# Patient Record
Sex: FEMALE | Race: NATIVE HAWAIIAN OR OTHER PACIFIC ISLANDER | Employment: OTHER | URBAN - METROPOLITAN AREA
[De-identification: names, ages, dates, MRNs, and addresses within clinical notes are randomized per-mention and may not be internally consistent; named-entity substitution may affect disease eponyms.]

---

## 2017-11-30 ENCOUNTER — TELEPHONE (OUTPATIENT)
Dept: SURGERY | Age: 72
End: 2017-11-30

## 2017-11-30 NOTE — TELEPHONE ENCOUNTER
Could not leave message for pt to r/s appointment from 12- due to no insurance referral. Phone message said to call back could not reach party.

## 2017-12-01 ENCOUNTER — OFFICE VISIT (OUTPATIENT)
Dept: SURGERY | Age: 72
End: 2017-12-01

## 2017-12-01 VITALS
OXYGEN SATURATION: 95 % | HEART RATE: 52 BPM | DIASTOLIC BLOOD PRESSURE: 86 MMHG | TEMPERATURE: 98 F | HEIGHT: 60 IN | WEIGHT: 152.4 LBS | SYSTOLIC BLOOD PRESSURE: 164 MMHG | RESPIRATION RATE: 16 BRPM | BODY MASS INDEX: 29.92 KG/M2

## 2017-12-01 DIAGNOSIS — Z12.11 ENCOUNTER FOR SCREENING COLONOSCOPY: Primary | ICD-10-CM

## 2017-12-01 RX ORDER — QUETIAPINE FUMARATE 200 MG/1
200 TABLET, FILM COATED ORAL 2 TIMES DAILY
COMMUNITY

## 2017-12-01 RX ORDER — POLYETHYLENE GLYCOL 3350, SODIUM SULFATE ANHYDROUS, SODIUM BICARBONATE, SODIUM CHLORIDE, POTASSIUM CHLORIDE 236; 22.74; 6.74; 5.86; 2.97 G/4L; G/4L; G/4L; G/4L; G/4L
POWDER, FOR SOLUTION ORAL
Qty: 1 BOTTLE | Refills: 0 | Status: SHIPPED | OUTPATIENT
Start: 2017-12-01 | End: 2017-12-01 | Stop reason: CLARIF

## 2017-12-01 RX ORDER — POLYETHYLENE GLYCOL 3350, SODIUM SULFATE ANHYDROUS, SODIUM BICARBONATE, SODIUM CHLORIDE, POTASSIUM CHLORIDE 236; 22.74; 6.74; 5.86; 2.97 G/4L; G/4L; G/4L; G/4L; G/4L
POWDER, FOR SOLUTION ORAL
Qty: 1 BOTTLE | Refills: 0 | Status: SHIPPED | OUTPATIENT
Start: 2017-12-01 | End: 2018-01-25

## 2017-12-01 NOTE — MR AVS SNAPSHOT
Visit Information Date & Time Provider Department Dept. Phone Encounter #  
 12/1/2017 10:30 AM MAYE Parikh. [id number] Upcoming Health Maintenance Date Due DTaP/Tdap/Td series (1 - Tdap) 5/12/1967 BREAST CANCER SCRN MAMMOGRAM 5/12/1996 FOBT Q 1 YEAR AGE 50-75 5/12/1996 ZOSTER VACCINE AGE 60> 3/12/2006 GLAUCOMA SCREENING Q2Y 5/12/2011 OSTEOPOROSIS SCREENING (DEXA) 5/12/2011 Pneumococcal 65+ Low/Medium Risk (1 of 2 - PCV13) 5/12/2011 MEDICARE YEARLY EXAM 5/12/2011 Influenza Age 5 to Adult 8/1/2017 Allergies as of 12/1/2017  Review Complete On: 12/1/2017 By: Janet Tejada RN No Known Allergies Current Immunizations  Never Reviewed No immunizations on file. Not reviewed this visit Vitals BP Pulse Temp Resp Height(growth percentile) Weight(growth percentile) 164/86 (BP 1 Location: Right arm, BP Patient Position: Sitting) (!) 52 98 °F (36.7 °C) (Oral) 16 5' (1.524 m) 152 lb 6.4 oz (69.1 kg) SpO2 BMI OB Status Smoking Status 95% 29.76 kg/m2 Menopause Never Smoker BMI and BSA Data Body Mass Index Body Surface Area  
 29.76 kg/m 2 1.71 m 2 Preferred Pharmacy Pharmacy Name Phone [id number] [phone] [phone] Your Updated Medication List  
  
   
This list is accurate as of: 12/1/17 11:02 AM.  Always use your most recent med list.  
  
  
  
  
 allopurinol 300 mg tablet Commonly known as:  ZYLOPRIM  
300 mg.  
  
 aspirin delayed-release 81 mg tablet Take 81 mg by mouth daily. atorvastatin 40 mg tablet Commonly known as:  LIPITOR Take 40 mg by mouth daily. donepezil 10 mg tablet Commonly known as:  ARICEPT Take 10 mg by mouth nightly. FLUoxetine 10 mg capsule Commonly known as:  PROzac  
5 mg. HYDROcodone-acetaminophen 5-325 mg per tablet Commonly known as:  Alvaro Reich Take 1 tablet by mouth every four (4) hours as needed for Pain.  
  
 isosorbide mononitrate ER 60 mg CR tablet Commonly known as:  IMDUR Take 30 mg by mouth every morning. lisinopril 2.5 mg tablet Commonly known as:  Renae Shames Take 2.5 mg by mouth daily. sertraline 100 mg tablet Commonly known as:  ZOLOFT Take 100 mg by mouth daily. traZODone 50 mg tablet Commonly known as:  Cornezoraida Haymaker Take 50 mg by mouth nightly. Introducing [facility] & HEALTH SERVICES! [facility] introduces Diavibe patient portal. Now you can access parts of your medical record, email your doctor's office, and request medication refills online. 1. In your internet browser, go to [email or web address] 2. Click on the First Time User? Click Here link in the Sign In box. You will see the New Member Sign Up page. 3. Enter your Diavibe Access Code exactly as it appears below. You will not need to use this code after youve completed the sign-up process. If you do not sign up before the expiration date, you must request a new code. · Diavibe Access Code: [email or web address] Expires: 3/1/2018 10:18 AM 
 
4. Enter the last four digits of your Social Security Number (xxxx) and Date of Birth (mm/dd/yyyy) as indicated and click Submit. You will be taken to the next sign-up page. 5. Create a Divvyshott ID. This will be your Diavibe login ID and cannot be changed, so think of one that is secure and easy to remember. 6. Create a Diavibe password. You can change your password at any time. 7. Enter your Password Reset Question and Answer. This can be used at a later time if you forget your password. 8. Enter your e-mail address. You will receive e-mail notification when new information is available in [address] 9. Click Sign Up. You can now view and download portions of your medical record. 10. Click the Download Summary menu link to download a portable copy of your medical information. If you have questions, please visit the Frequently Asked Questions section of the CellVir website. Remember, CellVir is NOT to be used for urgent needs. For medical emergencies, dial 911. Now available from your iPhone and Android! Please provide this summary of care documentation to your next provider. Your primary care clinician is listed as Bahman Eisenberg. If you have any questions after today's visit, please call [phone].

## 2017-12-01 NOTE — PATIENT INSTRUCTIONS
If you have any questions or concerns about today's appointment, the verbal and/or written instructions you were given for follow up care, please call our office at [phone].     [facility] - [address], [facility]    [phone] office  [phone]

## 2017-12-01 NOTE — PROGRESS NOTES
Colon Screen    Patient: Andrea Brock MRN: [id number]  SSN: [id number]    YOB: 1945  Age: 71 y.o. Sex: female        Subjective:   Andrea Brock presents for colon screening. PCP is Patricia Briceño MD. Patient denies abdominal pain, constipation, rectal pain or bleeding, recent FOBT positive. Abdominal surgeries as described below, specifically C section years ago. Triple bypass surgery April 2017. Patient has a bowel movement 3 times per week, according to patient son/caregiver this is normal bowel pattern. Patient has no known family history of colon cancer. First colonoscopy     No Known Allergies    Past Medical History:   Diagnosis Date    Dementia     Gout     Hypertension     Rheumatoid arthritis(714.0) ([facility] [address]      Past Surgical History:   Procedure Laterality Date    HX GYN  C Section    HX ORTHOPAEDIC Right     knee    HX VASCULAR ACCESS  Cardiac stents placed 10 years ago. No family history on file. Social History   Substance Use Topics    Smoking status: Never Smoker    Smokeless tobacco: Never Used    Alcohol use No      Prior to Admission medications    Medication Sig Start Date End Date Taking? Authorizing Provider   QUEtiapine (SEROQUEL) 200 mg tablet Take 200 mg by mouth two (2) times a day. Yes Historical Provider   CARVEDILOL (COREG PO) Take  by mouth. Yes Historical Provider   AMIODARONE HCL (AMIODARONE PO) Take 100 mg by mouth two (2) times a day. Yes Historical Provider   SODIUM BICARBONATE PO Take  by mouth. Yes Historical Provider   FUROSEMIDE (LASIX PO) Take  by mouth every other day. Yes Historical Provider   OXYBUTYNIN CHLORIDE PO Take  by mouth. Yes Historical Provider   allopurinol (ZYLOPRIM) 300 mg tablet 300 mg. Yes Historical Provider   donepezil (ARICEPT) 10 mg tablet Take 10 mg by mouth nightly. Yes Warren Gonzales MD   atorvastatin (LIPITOR) 40 mg tablet Take 40 mg by mouth daily.    Yes Warren Gonzales MD   aspirin delayed-release 81 mg tablet Take 81 mg by mouth daily. Yes Warren Gonzales MD   isosorbide mononitrate ER (IMDUR) 60 mg CR tablet Take 30 mg by mouth every morning. Yes Warren Gonzales MD   FLUoxetine (PROZAC) 10 mg capsule 5 mg. Historical Provider   hydrocodone-acetaminophen (NORCO) 5-325 mg per tablet Take 1 tablet by mouth every four (4) hours as needed for Pain. 9/27/14   Erna Staton NP   lisinopril (PRINIVIL, ZESTRIL) 2.5 mg tablet Take 2.5 mg by mouth daily. Warren Gonzales MD   traZODone (DESYREL) 50 mg tablet Take 50 mg by mouth nightly. Warren Gonzales MD   sertraline (ZOLOFT) 100 mg tablet Take 100 mg by mouth daily. Warren Gonzales MD          Review of Systems:  Gastrointestinal: negative      Risks colonoscopy described- colon injury, missed lesion, anesthesia problems, bleeding. Colonoscopy preparation reviewed in detail with patient and a copy of the instructions was provided to the patient.        Elisha Hawley RN  December 1, 2017  11:03 AM

## 2018-01-25 ENCOUNTER — HOSPITAL ENCOUNTER (OUTPATIENT)
Age: 73
Setting detail: OUTPATIENT SURGERY
Discharge: HOME | End: 2018-01-25
Attending: COLON & RECTAL SURGERY | Admitting: COLON & RECTAL SURGERY
Payer: MEDICARE

## 2018-01-25 VITALS
OXYGEN SATURATION: 96 % | BODY MASS INDEX: 30.43 KG/M2 | WEIGHT: 155 LBS | DIASTOLIC BLOOD PRESSURE: 84 MMHG | HEART RATE: 70 BPM | HEIGHT: 60 IN | SYSTOLIC BLOOD PRESSURE: 133 MMHG | TEMPERATURE: 98 F | RESPIRATION RATE: 16 BRPM

## 2018-01-25 PROCEDURE — 88305 TISSUE EXAM BY PATHOLOGIST: CPT | Performed by: COLON & RECTAL SURGERY

## 2018-01-25 PROCEDURE — 77030009426 HC FCPS BIOP ENDOSC BSC -B: Performed by: COLON & RECTAL SURGERY

## 2018-01-25 PROCEDURE — 76040000007: Performed by: COLON & RECTAL SURGERY

## 2018-01-25 PROCEDURE — 74011250636 HC RX REV CODE- 250/636

## 2018-01-25 PROCEDURE — G0500 MOD SEDAT ENDO SERVICE >5YRS: HCPCS | Performed by: COLON & RECTAL SURGERY

## 2018-01-25 PROCEDURE — 77030031670 HC DEV INFL ENDOTEK BIG60 MRTM -B: Performed by: COLON & RECTAL SURGERY

## 2018-01-25 RX ORDER — FLUMAZENIL 0.1 MG/ML
0.2 INJECTION INTRAVENOUS
Status: DISCONTINUED | OUTPATIENT
Start: 2018-01-25 | End: 2018-01-25 | Stop reason: HOSPADM

## 2018-01-25 RX ORDER — DEXTROMETHORPHAN/PSEUDOEPHED 2.5-7.5/.8
1.2 DROPS ORAL
Status: DISCONTINUED | OUTPATIENT
Start: 2018-01-25 | End: 2018-01-25 | Stop reason: HOSPADM

## 2018-01-25 RX ORDER — NALOXONE HYDROCHLORIDE 0.4 MG/ML
0.4 INJECTION, SOLUTION INTRAMUSCULAR; INTRAVENOUS; SUBCUTANEOUS
Status: DISCONTINUED | OUTPATIENT
Start: 2018-01-25 | End: 2018-01-25 | Stop reason: HOSPADM

## 2018-01-25 RX ORDER — SODIUM CHLORIDE 9 MG/ML
50 INJECTION, SOLUTION INTRAVENOUS CONTINUOUS
Status: DISCONTINUED | OUTPATIENT
Start: 2018-01-25 | End: 2018-01-25 | Stop reason: HOSPADM

## 2018-01-25 RX ORDER — EPINEPHRINE 0.1 MG/ML
1 INJECTION INTRACARDIAC; INTRAVENOUS
Status: DISCONTINUED | OUTPATIENT
Start: 2018-01-25 | End: 2018-01-25 | Stop reason: HOSPADM

## 2018-01-25 RX ORDER — SODIUM CHLORIDE 0.9 % (FLUSH) 0.9 %
5-10 SYRINGE (ML) INJECTION AS NEEDED
Status: DISCONTINUED | OUTPATIENT
Start: 2018-01-25 | End: 2018-01-25 | Stop reason: HOSPADM

## 2018-01-25 RX ORDER — MIDAZOLAM HYDROCHLORIDE 1 MG/ML
.25-5 INJECTION, SOLUTION INTRAMUSCULAR; INTRAVENOUS
Status: DISCONTINUED | OUTPATIENT
Start: 2018-01-25 | End: 2018-01-25 | Stop reason: HOSPADM

## 2018-01-25 RX ORDER — ATROPINE SULFATE 0.1 MG/ML
0.5 INJECTION INTRAVENOUS
Status: DISCONTINUED | OUTPATIENT
Start: 2018-01-25 | End: 2018-01-25 | Stop reason: HOSPADM

## 2018-01-25 RX ORDER — MIDAZOLAM HYDROCHLORIDE 1 MG/ML
INJECTION, SOLUTION INTRAMUSCULAR; INTRAVENOUS
Status: DISCONTINUED
Start: 2018-01-25 | End: 2018-01-25 | Stop reason: HOSPADM

## 2018-01-25 RX ORDER — SODIUM CHLORIDE 0.9 % (FLUSH) 0.9 %
5-10 SYRINGE (ML) INJECTION EVERY 8 HOURS
Status: DISCONTINUED | OUTPATIENT
Start: 2018-01-25 | End: 2018-01-25 | Stop reason: HOSPADM

## 2018-01-25 NOTE — IP AVS SNAPSHOT
[id number] 
[phone] Patient: David Hills MRN: [id number] About your hospitalization You were admitted on:  January 25, 2018 You last received care in the:  [facility] PHASE 2 RECOVERY You were discharged on:  January 25, 2018 Why you were hospitalized Your primary diagnosis was:  Not on File Follow-up Information Follow up With Details Comments Contact Info Shannan Camacho MD   [address] [facility] [address] [facility] [id number] [phone] MD Dr Sulma Heredia will contact you with results [address] [address] [id number] [phone] Discharge Orders None A check argenis indicates which time of day the medication should be taken. My Medications CONTINUE taking these medications Instructions Each Dose to Equal  
 Morning Noon Evening Bedtime  
 allopurinol 300 mg tablet Commonly known as:  Adriana Garrett Your last dose was: Your next dose is:    
   
   
 300 mg.  
 300 mg  
    
   
   
   
  
 AMIODARONE PO Your last dose was: Your next dose is: Take 100 mg by mouth two (2) times a day. 100 mg  
    
   
   
   
  
 aspirin delayed-release 81 mg tablet Your last dose was: Your next dose is: Take 81 mg by mouth daily. 81 mg  
    
   
   
   
  
 atorvastatin 40 mg tablet Commonly known as:  LIPITOR Your last dose was: Your next dose is: Take 40 mg by mouth daily. 40 mg COREG PO Your last dose was: Your next dose is: Take  by mouth. donepezil 10 mg tablet Commonly known as:  ARICEPT Your last dose was: Your next dose is: Take 10 mg by mouth nightly. 10 mg FLUoxetine 10 mg capsule Commonly known as:  PROzac  
 Your last dose was: Your next dose is:    
   
   
 5 mg.  
 5 mg HYDROcodone-acetaminophen 5-325 mg per tablet Commonly known as:  Rika Jarrett Your last dose was: Your next dose is: Take 1 tablet by mouth every four (4) hours as needed for Pain. 1 Tab  
    
   
   
   
  
 isosorbide mononitrate ER 60 mg CR tablet Commonly known as:  IMDUR Your last dose was: Your next dose is: Take 30 mg by mouth every morning. 30 mg  
    
   
   
   
  
 LASIX PO Your last dose was: Your next dose is: Take  by mouth every other day. lisinopril 2.5 mg tablet Commonly known as:  Mitzy Shoemaker Your last dose was: Your next dose is: Take 2.5 mg by mouth daily. 2.5 mg  
    
   
   
   
  
 OXYBUTYNIN CHLORIDE PO Your last dose was: Your next dose is: Take  by mouth. SEROquel 200 mg tablet Generic drug:  QUEtiapine Your last dose was: Your next dose is: Take 200 mg by mouth two (2) times a day. 200 mg  
    
   
   
   
  
 sertraline 100 mg tablet Commonly known as:  ZOLOFT Your last dose was: Your next dose is: Take 100 mg by mouth daily. 100 mg  
    
   
   
   
  
 SODIUM BICARBONATE PO Your last dose was: Your next dose is: Take  by mouth. traZODone 50 mg tablet Commonly known as:  Yohana Oshea Your last dose was: Your next dose is: Take 50 mg by mouth nightly. 50 mg  
    
   
   
   
  
  
STOP taking these medications PEG 3350-Electrolytes 236-22.74-6.74 -5.86 gram suspension Commonly known as:  GO-LYTELY Discharge Instructions Colonoscopy Discharge Instructions Altagracia Wooten [id number] 1945 COLONOSCOPY FINDINGS: 
Your colonoscopy showed: 1. One colon polyp which was completely removed. 2. Mild diverticulosis of the colon. FOLLOW UP RECOMMENDATIONS: 
 Dr. Quintin Feliz will contact you with your results. Dr. Quintin Feliz will recommend your next colonoscopy after the Pathology results are available. DISCOMFORT: 
If you have redness at your IV site- apply warm compress to area; if redness or soreness persist- contact your physician There may be a slight amount of blood passed from the rectum, more than a teaspoon of bright red blood is not expected - contact your physician Gaseous discomfort is common- walking, belching will help relieve any gas pains. If discomfort persist- contact your physician DIET: 
 High fiber diet. ACTIVITY: 
You may resume your normal daily activities, however, it is recommended that you spend the remainder of the day resting - avoiding any strenuous activities. You may not operate a vehicle for 24 hours You may not engage in an occupation involving machinery or appliances for rest of today You may not drink alcoholic beverages for at least 24 hours Avoid making any critical decisions for at least 24 hour CALL M.D. ANY SIGN OF: Increasing pain, nausea, vomiting Abdominal distension (swelling) New increased bleeding Fever or chills Pain in chest area or shortness of breath Arsh Parra MD, FACS, FASCRS Colon and Rectal Surgery [facility] Office [phone] Fax     [phone] DISCHARGE SUMMARY from Nurse PATIENT INSTRUCTIONS: 
 
After general anesthesia or intravenous sedation, for 24 hours or while taking prescription Narcotics: · Limit your activities · Do not drive and operate hazardous machinery · Do not make important personal or business decisions · Do  not drink alcoholic beverages · If you have not urinated within 8 hours after discharge, please contact your surgeon on call. Report the following to your surgeon: 
· Excessive pain, swelling, redness or odor of or around the surgical area · Temperature over 100.5 · Nausea and vomiting lasting longer than 4 hours or if unable to take medications · Any signs of decreased circulation or nerve impairment to extremity: change in color, persistent  numbness, tingling, coldness or increase pain · Any questions What to do at Home: These are general instructions for a healthy lifestyle: No smoking/ No tobacco products/ Avoid exposure to second hand smoke Surgeon General's Warning:  Quitting smoking now greatly reduces serious risk to your health. Obesity, smoking, and sedentary lifestyle greatly increases your risk for illness A healthy diet, regular physical exercise & weight monitoring are important for maintaining a healthy lifestyle You may be retaining fluid if you have a history of heart failure or if you experience any of the following symptoms:  Weight gain of 3 pounds or more overnight or 5 pounds in a week, increased swelling in our hands or feet or shortness of breath while lying flat in bed. Please call your doctor as soon as you notice any of these symptoms; do not wait until your next office visit. Recognize signs and symptoms of STROKE: 
 
F-face looks uneven A-arms unable to move or move unevenly S-speech slurred or non-existent T-time-call 911 as soon as signs and symptoms begin-DO NOT go Back to bed or wait to see if you get better-TIME IS BRAIN. Warning Signs of HEART ATTACK Call 911 if you have these symptoms: 
? Chest discomfort. Most heart attacks involve discomfort in the center of the chest that lasts more than a few minutes, or that goes away and comes back. It can feel like uncomfortable pressure, squeezing, fullness, or pain. ? Discomfort in other areas of the upper body.  Symptoms can include pain or discomfort in one or both arms, the back, neck, jaw, or stomach. ? Shortness of breath with or without chest discomfort. ? Other signs may include breaking out in a cold sweat, nausea, or lightheadedness. Don't wait more than five minutes to call [address]! Fast action can save your life. Calling 911 is almost always the fastest way to get lifesaving treatment. Emergency Medical Services staff can begin treatment when they arrive  up to an hour sooner than if someone gets to the hospital by car. The discharge information has been reviewed with the patient. The patient verbalized understanding. Discharge medications reviewed with the patient and appropriate educational materials and side effects teaching were provided. ___________________________________________________________________________________________________________________________________ Patient armband removed and given to patient to take home. Patient was informed of the privacy risks if armband lost or stolen Introducing [facility] & HEALTH SERVICES! Carlos Liang introduces [employer] patient portal. Now you can access parts of your medical record, email your doctor's office, and request medication refills online. 1. In your internet browser, go to [email or web address] 2. Click on the First Time User? Click Here link in the Sign In box. You will see the New Member Sign Up page. 3. Enter your [employer] Access Code exactly as it appears below. You will not need to use this code after youve completed the sign-up process. If you do not sign up before the expiration date, you must request a new code. · [employer] Access Code: [email or web address] Expires: 3/1/2018 10:18 AM 
 
4. Enter the last four digits of your Social Security Number (xxxx) and Date of Birth (mm/dd/yyyy) as indicated and click Submit. You will be taken to the next sign-up page. 5. Create a AppLovin ID. This will be your AppLov[employer] login ID and cannot be changed, so think of one that is secure and easy to remember. 6. Create a AppLovin password. You can change your password at any time. 7. Enter your Password Reset Question and Answer. This can be used at a later time if you forget your password. 8. Enter your e-mail address. You will receive e-mail notification when new information is available in [address] 9. Click Sign Up. You can now view and download portions of your medical record. 10. Click the Download Summary menu link to download a portable copy of your medical information. If you have questions, please visit the Frequently Asked Questions section of the AppLov[employer] website. Remember, [employer] is NOT to be used for urgent needs. For medical emergencies, dial 911. Now available from your iPhone and Android! Providers Seen During Your Hospitalization Provider Specialty Primary office phone Da Botello MD Colon and Rectal Surgery [phone] Your Primary Care Physician (PCP) Primary Care Physician Office Phone Office Fax Jemima Boy [phone] You are allergic to the following No active allergies Recent Documentation Height Weight BMI OB Status Smoking Status 1.524 m 70.3 kg 30.27 kg/m2 Menopause Never Smoker Emergency Contacts Name Discharge Info Relation Home Work Mobile [address] CAREGIVER [3] Son [22]   [phone] Patient Belongings The following personal items are in your possession at time of discharge: 
  Dental Appliances: None  Visual Aid: Glasses Please provide this summary of care documentation to your next provider. Signatures-by signing, you are acknowledging that this After Visit Summary has been reviewed with you and you have received a copy.   
  
 
  
    
    
 Patient Signature: ____________________________________________________________ Date:  ____________________________________________________________  
  
Yajaira Keas Provider Signature:  ____________________________________________________________ Date:  ____________________________________________________________

## 2018-01-25 NOTE — H&P
[facility]  [address] [facility][note 3], [address] [place], [place] [address], [address] [facility]        Colon and Rectal Surgery History and Physical    Subjective:      Kaylene Ferguson is a 71 y.o. female who presents for colonoscopy screening. PCP is Steffen De Jesus MD. Patient denies abdominal pain, constipation, rectal pain or bleeding, recent FOBT positive. Abdominal surgeries as described below, specifically C section years ago. Triple bypass surgery April 2017. Patient has a bowel movement 3 times per week, according to patient son/caregiver this is normal bowel pattern. Patient has no known family history of colon cancer.      First colonoscopy       Patient Active Problem List    Diagnosis Date Noted    Atypical chest pain 05/23/2014    Right knee pain 05/23/2014    Dementia 05/23/2014    UTI (urinary tract infection) 05/23/2014     Past Medical History:   Diagnosis Date    Dementia     Depression     Gout     Heart failure ([address]     Hypertension     Incontinence     Rheumatoid arthritis(714.0)       Past Surgical History:   Procedure Laterality Date    HX GYN  C Section    HX ORTHOPAEDIC Right     knee    HX OTHER SURGICAL  04/2017    triple cardiac bypass    HX VASCULAR ACCESS  Cardiac stents placed 10 years ago. Social History   Substance Use Topics    Smoking status: Never Smoker    Smokeless tobacco: Never Used    Alcohol use No      History reviewed. No pertinent family history. Prior to Admission medications    Medication Sig Start Date End Date Taking? Authorizing Provider   QUEtiapine (SEROQUEL) 200 mg tablet Take 200 mg by mouth two (2) times a day. Yes Historical Provider   CARVEDILOL (COREG PO) Take  by mouth. Yes Historical Provider   AMIODARONE HCL (AMIODARONE PO) Take 100 mg by mouth two (2) times a day. Yes Historical Provider   SODIUM BICARBONATE PO Take  by mouth. Yes Historical Provider   FUROSEMIDE (LASIX PO) Take  by mouth every other day.    Yes Historical Provider   OXYBUTYNIN CHLORIDE PO Take  by mouth. Yes Historical Provider   PEG 3350-Electrolytes (GO-LYTELY) 236-22.74-6.74 -5.86 gram suspension Take as directed  Indications: BOWEL EVACUATION 12/1/17  Yes Marni Hernandes MD   allopurinol (ZYLOPRIM) 300 mg tablet 300 mg. Yes Historical Provider   FLUoxetine (PROZAC) 10 mg capsule 5 mg. Yes Historical Provider   hydrocodone-acetaminophen (NORCO) 5-325 mg per tablet Take 1 tablet by mouth every four (4) hours as needed for Pain. 9/27/14  Yes Rissa Husbands, NP   lisinopril (PRINIVIL, ZESTRIL) 2.5 mg tablet Take 2.5 mg by mouth daily. Yes Warren Gonzales MD   donepezil (ARICEPT) 10 mg tablet Take 10 mg by mouth nightly. Yes Warren Gonzales MD   atorvastatin (LIPITOR) 40 mg tablet Take 40 mg by mouth daily. Yes Warren Gonzales MD   aspirin delayed-release 81 mg tablet Take 81 mg by mouth daily. Yes Warren Gonzales MD   traZODone (DESYREL) 50 mg tablet Take 50 mg by mouth nightly. Yes Warren Gonzales MD   sertraline (ZOLOFT) 100 mg tablet Take 100 mg by mouth daily. Yes Warren Gonzales MD   isosorbide mononitrate ER (IMDUR) 60 mg CR tablet Take 30 mg by mouth every morning.    Yes Warren Gonzales MD     Current Facility-Administered Medications   Medication Dose Route Frequency    0.9% sodium chloride infusion  50 mL/hr IntraVENous CONTINUOUS    sodium chloride (NS) flush 5-10 mL  5-10 mL IntraVENous Q8H    sodium chloride (NS) flush 5-10 mL  5-10 mL IntraVENous PRN    midazolam (VERSED) injection 0.25-5 mg  0.25-5 mg IntraVENous Multiple    meperidine (DEMEROL) injection 25-50 mg  25-50 mg IntraVENous Multiple    naloxone (NARCAN) injection 0.4 mg  0.4 mg IntraVENous Multiple    flumazenil (ROMAZICON) 0.1 mg/mL injection 0.2 mg  0.2 mg IntraVENous Multiple    simethicone (MYLICON) 77SY/3.0MD oral drops 80 mg  1.2 mL Oral Multiple    atropine injection 0.5 mg  0.5 mg IntraVENous ONCE PRN    EPINEPHrine (ADRENALIN) 0.1 mg/mL syringe 1 mg  1 mg Endoscopically ONCE PRN    midazolam (VERSED) 1 mg/mL injection        meperidine (DEMEROL) 100 mg/ml injection         No Known Allergies         Objective:     Visit Vitals    /65    Pulse 66    Temp 98 °F (36.7 °C)    Resp 18    Ht 5' (1.524 m)    Wt 70.3 kg (155 lb)    SpO2 97%    BMI 30.27 kg/m2        Physical Exam:   GENERAL: alert, cooperative, no distress, appears stated age  LUNG: clear to auscultation bilaterally  HEART: regular rate and rhythm  ABDOMEN: soft, non-tender. Bowel sounds normal. No masses,  no organomegaly       Assessment:     Kaylene Ferguson is a 71 y.o. female who requires colonoscopy for   Screening colonoscopy. Plan:     1. I recommend proceeding with colonoscopy. The patient was in full agreement and was eager to proceed. 2. I discussed the details of the colonoscopy procedure. The risks of colonoscopy were discussed including colon injury/perforation, anesthesia issues, bleeding, and the possibility of incomplete examination. The patient was willing to accept these risks and proceed with the examination. All questions were answered to the patient's satisfaction.          Russ Kumar MD, FACS, FASCRS  Colon and Rectal Surgery  [facility]  Office [phone]  Fax     [phone]  1/25/2018  2:03 PM

## 2018-01-25 NOTE — DISCHARGE INSTRUCTIONS
Colonoscopy Discharge Instructions       Godfrey Barksdale  [id number]  1945      COLONOSCOPY FINDINGS:  Your colonoscopy showed: 1. One colon polyp which was completely removed. 2. Mild diverticulosis of the colon. FOLLOW UP RECOMMENDATIONS:   Dr. Hameed Rater will contact you with your results. Dr. Hameed Rater will recommend your next colonoscopy after the Pathology results are available. DISCOMFORT:  If you have redness at your IV site- apply warm compress to area; if redness or soreness persist- contact your physician  There may be a slight amount of blood passed from the rectum, more than a teaspoon of bright red blood is not expected - contact your physician  Gaseous discomfort is common- walking, belching will help relieve any gas pains. If discomfort persist- contact your physician    DIET:   High fiber diet. ACTIVITY:  You may resume your normal daily activities, however, it is recommended that you spend the remainder of the day resting - avoiding any strenuous activities. You may not operate a vehicle for 24 hours  You may not engage in an occupation involving machinery or appliances for rest of today  You may not drink alcoholic beverages for at least 24 hours  Avoid making any critical decisions for at least 24 hour    CALL M.D.   ANY SIGN OF:   Increasing pain, nausea, vomiting  Abdominal distension (swelling)  New increased bleeding   Fever or chills  Pain in chest area or shortness of breath      Leticia Mcleod MD, FACS, FASCRS  Colon and Rectal Surgery  [facility]  Office [phone]  Fax     [phone] SUMMARY from Nurse    PATIENT INSTRUCTIONS:    After general anesthesia or intravenous sedation, for 24 hours or while taking prescription Narcotics:  · Limit your activities  · Do not drive and operate hazardous machinery  · Do not make important personal or business decisions  · Do  not drink alcoholic beverages  · If you have not urinated within 8 hours after discharge, please contact your surgeon on call. Report the following to your surgeon:  · Excessive pain, swelling, redness or odor of or around the surgical area  · Temperature over 100.5  · Nausea and vomiting lasting longer than 4 hours or if unable to take medications  · Any signs of decreased circulation or nerve impairment to extremity: change in color, persistent  numbness, tingling, coldness or increase pain  · Any questions    What to do at Home:    These are general instructions for a healthy lifestyle:    No smoking/ No tobacco products/ Avoid exposure to second hand smoke  Surgeon General's Warning:  Quitting smoking now greatly reduces serious risk to your health. Obesity, smoking, and sedentary lifestyle greatly increases your risk for illness    A healthy diet, regular physical exercise & weight monitoring are important for maintaining a healthy lifestyle    You may be retaining fluid if you have a history of heart failure or if you experience any of the following symptoms:  Weight gain of 3 pounds or more overnight or 5 pounds in a week, increased swelling in our hands or feet or shortness of breath while lying flat in bed. Please call your doctor as soon as you notice any of these symptoms; do not wait until your next office visit. Recognize signs and symptoms of STROKE:    F-face looks uneven    A-arms unable to move or move unevenly    S-speech slurred or non-existent    T-time-call 911 as soon as signs and symptoms begin-DO NOT go       Back to bed or wait to see if you get better-TIME IS BRAIN. Warning Signs of HEART ATTACK     Call 911 if you have these symptoms:   Chest discomfort. Most heart attacks involve discomfort in the center of the chest that lasts more than a few minutes, or that goes away and comes back. It can feel like uncomfortable pressure, squeezing, fullness, or pain.  Discomfort in other areas of the upper body.  Symptoms can include pain or discomfort in one or both arms, the back, neck, jaw, or stomach.  Shortness of breath with or without chest discomfort.  Other signs may include breaking out in a cold sweat, nausea, or lightheadedness. Don't wait more than five minutes to call 911 - MINUTES MATTER! Fast action can save your life. Calling 911 is almost always the fastest way to get lifesaving treatment. Emergency Medical Services staff can begin treatment when they arrive -- up to an hour sooner than if someone gets to the hospital by car. The discharge information has been reviewed with the patient. The patient verbalized understanding. Discharge medications reviewed with the patient and appropriate educational materials and side effects teaching were provided. ___________________________________________________________________________________________________________________________________  Patient armband removed and given to patient to take home.   Patient was informed of the privacy risks if armband lost or stolen

## 2018-01-25 NOTE — PROCEDURES
Colonoscopy Procedure Note      Wayne Paulson  1945  [id number]                Date of Procedure: 1/25/2018    Indications:    Screening colonoscopy     Preoperative diagnosis: colon cancer screening z12.11      Postoperative diagnosis: diverticulosis, polyp    Title of Procedure:  Colonoscopy with polypectomy    :  Manolo Ovalles MD    Assistant(s): Endoscopy RN-1: Farhad Diane RN  Endoscopy RN-2: Martina Dumont RN    Referring Source:  Abigail Miranda MD    Sedation:  Demerol 50 mg IV,  Versed 3 mg IV      ASA Class: ASA 3 - Severe systemic disease but compensated        Procedure Details:    Prior to the procedure, a history and physical were performed. The patients medications, allergies and sensitivities were reviewed and all questions were answered. After informed consent was obtained for the procedure, with all risks and benefits of procedure explained. The patient was taken to the endoscopy suite and placed in the left lateral decubitus position. Patient identification and proposed procedure were verified prior to the procedure by the nurse and I. Following the  satisfactory administration of sedation,  the anus was inspected and appeared within normal limits. Digital rectal examination revealed Normal sphincter tone and squeeze pressure. Palpation revealed No Masses. Next the Olympus video colonoscope was introduced through the anus and advanced to cecum, which was identified by the ileocecal valve and appendiceal orifice, terminal ileum. The quality of preparation was good. The terminal ileum was visualized. The colonoscope was then slowly withdrawn and the mucosa carefully examined for any abnormalities. Cecal withdrawl time was greater than six minutes. The patient tolerated the procedure well. Findings:   Rectum: normal  Sigmoid: 1  Sessile polyp(s), the largest 3 mm in size  mild diverticulosis;   Descending Colon: mild diverticulosis;  Transverse Colon: mild diverticulosis; Ascending Colon: mild diverticulosis; Cecum: normal  Terminal Ileum: normal      Interventions:  1 complete polypectomy was performed using cold biopsy forceps and the polyp was retrieved    Specimen Removed:   ID Type Source Tests Collected by Time Destination   1 : Bx polyp Preservative Sigmoid  Kat Granado MD 1/25/2018 1447 Pathology        Complications: None. EBL:  None. Impression:    diverticulosis, polyp     Recommendations: -Await pathology. Resume normal medication(s). Discharge Disposition:  Home in the company of a  when able to ambulate.         Kat Granado MD, FACS, FASCRS  Colon and Rectal Surgery  [facility]  Office [phone]  Fax     [phone]  1/25/2018  2:54 PM       [facility]  [address] [facility], [address], [address] [place] [address]

## (undated) DEVICE — FLEX ADVANTAGE 1500CC: Brand: FLEX ADVANTAGE

## (undated) DEVICE — KIT COLON W/ 1.1OZ LUB AND 2 END

## (undated) DEVICE — DEVICE INFL 60ML 12ATM CONVENIENT LOK REL HNDL HI PRSS FLX

## (undated) DEVICE — FORCEPS BX L240CM JAW DIA2.8MM L CAP W/ NDL MIC MESH TOOTH

## (undated) DEVICE — KENDALL 500 SERIES DIAPHORETIC FOAM MONITORING ELECTRODE - TEAR DROP SHAPE ( 30/PK): Brand: KENDALL

## (undated) DEVICE — MEDI-VAC NON-CONDUCTIVE SUCTION TUBING: Brand: CARDINAL HEALTH

## (undated) DEVICE — BASIN EMESIS 500CC ROSE 250/CS 60/PLT: Brand: MEDEGEN MEDICAL PRODUCTS, LLC

## (undated) DEVICE — CONTAINER PREFIL FRMLN 15ML --

## (undated) DEVICE — SYR 50ML SLIP TIP NSAF LF STRL --